# Patient Record
Sex: FEMALE | Race: BLACK OR AFRICAN AMERICAN | URBAN - METROPOLITAN AREA
[De-identification: names, ages, dates, MRNs, and addresses within clinical notes are randomized per-mention and may not be internally consistent; named-entity substitution may affect disease eponyms.]

---

## 2024-04-09 ENCOUNTER — HOSPITAL ENCOUNTER (OUTPATIENT)
Dept: WOMENS IMAGING | Age: 69
Discharge: HOME | End: 2024-04-09
Payer: MEDICARE

## 2024-04-09 DIAGNOSIS — Z78.0 MENOPAUSE: ICD-10-CM

## 2024-04-09 DIAGNOSIS — Z12.31 ENCOUNTER FOR SCREENING MAMMOGRAM FOR BREAST CANCER: ICD-10-CM

## 2024-04-09 PROCEDURE — 77080 DXA BONE DENSITY AXIAL: CPT

## 2024-04-09 PROCEDURE — 77063 BREAST TOMOSYNTHESIS BI: CPT

## 2024-07-08 ENCOUNTER — CASE MANAGEMENT (OUTPATIENT)
Dept: WOMENS IMAGING | Age: 69
End: 2024-07-08

## 2024-09-06 ENCOUNTER — TRANSCRIBE ORDERS (OUTPATIENT)
Dept: ADMINISTRATIVE | Age: 69
End: 2024-09-06

## 2024-09-06 DIAGNOSIS — R92.2 INCONCLUSIVE MAMMOGRAPHY: Primary | ICD-10-CM

## 2024-10-04 ENCOUNTER — HOSPITAL ENCOUNTER (OUTPATIENT)
Dept: ULTRASOUND IMAGING | Age: 69
Discharge: HOME | End: 2024-10-04
Payer: COMMERCIAL

## 2024-10-04 ENCOUNTER — HOSPITAL ENCOUNTER (OUTPATIENT)
Dept: WOMENS IMAGING | Age: 69
Discharge: HOME | End: 2024-10-04
Payer: COMMERCIAL

## 2024-10-04 DIAGNOSIS — R92.2 INCONCLUSIVE MAMMOGRAPHY: ICD-10-CM

## 2024-10-04 DIAGNOSIS — R92.8 ABNORMAL MAMMOGRAM: ICD-10-CM

## 2024-10-04 PROCEDURE — G0279 TOMOSYNTHESIS, MAMMO: HCPCS

## 2024-10-04 PROCEDURE — 76642 ULTRASOUND BREAST LIMITED: CPT

## 2025-06-06 ENCOUNTER — APPOINTMENT (OUTPATIENT)
Dept: CT IMAGING | Age: 70
End: 2025-06-06
Payer: MEDICARE

## 2025-06-06 ENCOUNTER — APPOINTMENT (OUTPATIENT)
Dept: GENERAL RADIOLOGY | Age: 70
End: 2025-06-06
Payer: MEDICARE

## 2025-06-06 ENCOUNTER — HOSPITAL ENCOUNTER (OUTPATIENT)
Age: 70
Setting detail: OBSERVATION
Discharge: HOME | End: 2025-06-07
Attending: EMERGENCY MEDICINE
Payer: MEDICARE

## 2025-06-06 DIAGNOSIS — R40.0 SOMNOLENCE: Primary | ICD-10-CM

## 2025-06-06 DIAGNOSIS — Z79.01 ANTICOAGULATED ON COUMADIN: ICD-10-CM

## 2025-06-06 DIAGNOSIS — R53.1 GENERALIZED WEAKNESS: ICD-10-CM

## 2025-06-06 PROBLEM — R42 DIZZINESS: Status: ACTIVE | Noted: 2025-06-06

## 2025-06-06 LAB
ALBUMIN SERPL-MCNC: 3.6 G/DL (ref 3.4–5)
ALBUMIN/GLOB SERPL: 1 {RATIO} (ref 1.1–2.2)
ALP SERPL-CCNC: 80 U/L (ref 40–129)
ALT SERPL-CCNC: 13 U/L (ref 10–40)
ANION GAP SERPL CALCULATED.3IONS-SCNC: 10 MMOL/L (ref 3–16)
AST SERPL-CCNC: 23 U/L (ref 15–37)
BACTERIA URNS QL MICRO: ABNORMAL /HPF
BILIRUB SERPL-MCNC: 0.7 MG/DL (ref 0–1)
BILIRUB UR QL STRIP.AUTO: NEGATIVE
BUN SERPL-MCNC: 14 MG/DL (ref 7–20)
CALCIUM SERPL-MCNC: 8.7 MG/DL (ref 8.3–10.6)
CHLORIDE SERPL-SCNC: 106 MMOL/L (ref 99–110)
CLARITY UR: CLEAR
CO2 SERPL-SCNC: 23 MMOL/L (ref 21–32)
COLOR UR: YELLOW
CREAT SERPL-MCNC: 0.9 MG/DL (ref 0.6–1.2)
DEPRECATED RDW RBC AUTO: 15.5 % (ref 12.4–15.4)
EKG DIAGNOSIS: NORMAL
EKG Q-T INTERVAL: 362 MS
EKG QRS DURATION: 84 MS
EKG QTC CALCULATION (BAZETT): 445 MS
EKG R AXIS: 20 DEGREES
EKG T AXIS: 46 DEGREES
EKG VENTRICULAR RATE: 91 BPM
EPI CELLS #/AREA URNS AUTO: 3 /HPF (ref 0–5)
GFR SERPLBLD CREATININE-BSD FMLA CKD-EPI: 68 ML/MIN/{1.73_M2}
GLUCOSE SERPL-MCNC: 113 MG/DL (ref 70–99)
GLUCOSE UR STRIP.AUTO-MCNC: NEGATIVE MG/DL
HCT VFR BLD AUTO: 37.3 % (ref 36–48)
HGB BLD-MCNC: 11.8 G/DL (ref 12–16)
HGB UR QL STRIP.AUTO: NEGATIVE
HYALINE CASTS #/AREA URNS AUTO: 1 /LPF (ref 0–8)
INR PPP: 1.95 (ref 0.85–1.15)
KETONES UR STRIP.AUTO-MCNC: ABNORMAL MG/DL
LEUKOCYTE ESTERASE UR QL STRIP.AUTO: ABNORMAL
MCH RBC QN AUTO: 26.7 PG (ref 26–34)
MCHC RBC AUTO-ENTMCNC: 31.6 G/DL (ref 31–36)
MCV RBC AUTO: 84.5 FL (ref 80–100)
NITRITE UR QL STRIP.AUTO: NEGATIVE
PH UR STRIP.AUTO: 6.5 [PH] (ref 5–8)
PLATELET # BLD AUTO: 188 K/UL (ref 135–450)
PMV BLD AUTO: 10.5 FL (ref 5–10.5)
POTASSIUM SERPL-SCNC: 4.4 MMOL/L (ref 3.5–5.1)
PROT SERPL-MCNC: 7.1 G/DL (ref 6.4–8.2)
PROT UR STRIP.AUTO-MCNC: NEGATIVE MG/DL
PROTHROMBIN TIME: 22.3 SEC (ref 11.9–14.9)
RBC # BLD AUTO: 4.41 M/UL (ref 4–5.2)
RBC CLUMPS #/AREA URNS AUTO: 1 /HPF (ref 0–4)
SODIUM SERPL-SCNC: 139 MMOL/L (ref 136–145)
SP GR UR STRIP.AUTO: 1.02 (ref 1–1.03)
TROPONIN, HIGH SENSITIVITY: 11 NG/L (ref 0–14)
TSH SERPL DL<=0.005 MIU/L-ACNC: 3.67 UIU/ML (ref 0.27–4.2)
UA DIPSTICK W REFLEX MICRO PNL UR: YES
URN SPEC COLLECT METH UR: ABNORMAL
UROBILINOGEN UR STRIP-ACNC: 4 E.U./DL
WBC # BLD AUTO: 6.8 K/UL (ref 4–11)
WBC #/AREA URNS AUTO: 2 /HPF (ref 0–5)

## 2025-06-06 PROCEDURE — 70450 CT HEAD/BRAIN W/O DYE: CPT

## 2025-06-06 PROCEDURE — 2500000003 HC RX 250 WO HCPCS

## 2025-06-06 PROCEDURE — 80053 COMPREHEN METABOLIC PANEL: CPT

## 2025-06-06 PROCEDURE — 96360 HYDRATION IV INFUSION INIT: CPT

## 2025-06-06 PROCEDURE — 96361 HYDRATE IV INFUSION ADD-ON: CPT

## 2025-06-06 PROCEDURE — 71045 X-RAY EXAM CHEST 1 VIEW: CPT

## 2025-06-06 PROCEDURE — G0378 HOSPITAL OBSERVATION PER HR: HCPCS

## 2025-06-06 PROCEDURE — 84443 ASSAY THYROID STIM HORMONE: CPT

## 2025-06-06 PROCEDURE — 85027 COMPLETE CBC AUTOMATED: CPT

## 2025-06-06 PROCEDURE — 93005 ELECTROCARDIOGRAM TRACING: CPT | Performed by: EMERGENCY MEDICINE

## 2025-06-06 PROCEDURE — 84484 ASSAY OF TROPONIN QUANT: CPT

## 2025-06-06 PROCEDURE — 99285 EMERGENCY DEPT VISIT HI MDM: CPT

## 2025-06-06 PROCEDURE — 96372 THER/PROPH/DIAG INJ SC/IM: CPT

## 2025-06-06 PROCEDURE — 93010 ELECTROCARDIOGRAM REPORT: CPT | Performed by: STUDENT IN AN ORGANIZED HEALTH CARE EDUCATION/TRAINING PROGRAM

## 2025-06-06 PROCEDURE — 85610 PROTHROMBIN TIME: CPT

## 2025-06-06 PROCEDURE — 6360000002 HC RX W HCPCS

## 2025-06-06 PROCEDURE — 81001 URINALYSIS AUTO W/SCOPE: CPT

## 2025-06-06 PROCEDURE — 2580000003 HC RX 258: Performed by: EMERGENCY MEDICINE

## 2025-06-06 PROCEDURE — 6370000000 HC RX 637 (ALT 250 FOR IP)

## 2025-06-06 RX ORDER — WARFARIN SODIUM 2.5 MG/1
2.5 TABLET ORAL
COMMUNITY

## 2025-06-06 RX ORDER — SODIUM CHLORIDE 0.9 % (FLUSH) 0.9 %
5-40 SYRINGE (ML) INJECTION PRN
Status: DISCONTINUED | OUTPATIENT
Start: 2025-06-06 | End: 2025-06-07 | Stop reason: HOSPADM

## 2025-06-06 RX ORDER — ONDANSETRON 2 MG/ML
4 INJECTION INTRAMUSCULAR; INTRAVENOUS EVERY 6 HOURS PRN
Status: DISCONTINUED | OUTPATIENT
Start: 2025-06-06 | End: 2025-06-07 | Stop reason: HOSPADM

## 2025-06-06 RX ORDER — ERGOCALCIFEROL 1.25 MG/1
50000 CAPSULE, LIQUID FILLED ORAL WEEKLY
COMMUNITY

## 2025-06-06 RX ORDER — 0.9 % SODIUM CHLORIDE 0.9 %
500 INTRAVENOUS SOLUTION INTRAVENOUS ONCE
Status: COMPLETED | OUTPATIENT
Start: 2025-06-06 | End: 2025-06-06

## 2025-06-06 RX ORDER — CARVEDILOL 6.25 MG/1
6.25 TABLET ORAL 2 TIMES DAILY WITH MEALS
COMMUNITY

## 2025-06-06 RX ORDER — WARFARIN SODIUM 2.5 MG/1
2.5 TABLET ORAL
Status: ACTIVE | OUTPATIENT
Start: 2025-06-06 | End: 2025-06-07

## 2025-06-06 RX ORDER — TRAMADOL HYDROCHLORIDE 50 MG/1
50 TABLET ORAL ONCE
Status: DISCONTINUED | OUTPATIENT
Start: 2025-06-06 | End: 2025-06-07 | Stop reason: HOSPADM

## 2025-06-06 RX ORDER — FUROSEMIDE 20 MG/1
20 TABLET ORAL DAILY
Status: DISCONTINUED | OUTPATIENT
Start: 2025-06-06 | End: 2025-06-07 | Stop reason: HOSPADM

## 2025-06-06 RX ORDER — ENOXAPARIN SODIUM 100 MG/ML
30 INJECTION SUBCUTANEOUS 2 TIMES DAILY
Status: DISCONTINUED | OUTPATIENT
Start: 2025-06-06 | End: 2025-06-07

## 2025-06-06 RX ORDER — ATORVASTATIN CALCIUM 80 MG/1
80 TABLET, FILM COATED ORAL NIGHTLY
Status: DISCONTINUED | OUTPATIENT
Start: 2025-06-06 | End: 2025-06-07

## 2025-06-06 RX ORDER — ONDANSETRON 4 MG/1
4 TABLET, ORALLY DISINTEGRATING ORAL EVERY 8 HOURS PRN
Status: DISCONTINUED | OUTPATIENT
Start: 2025-06-06 | End: 2025-06-07 | Stop reason: HOSPADM

## 2025-06-06 RX ORDER — CARVEDILOL 6.25 MG/1
6.25 TABLET ORAL 2 TIMES DAILY WITH MEALS
Status: DISCONTINUED | OUTPATIENT
Start: 2025-06-06 | End: 2025-06-07 | Stop reason: HOSPADM

## 2025-06-06 RX ORDER — SODIUM CHLORIDE 9 MG/ML
INJECTION, SOLUTION INTRAVENOUS PRN
Status: DISCONTINUED | OUTPATIENT
Start: 2025-06-06 | End: 2025-06-07 | Stop reason: HOSPADM

## 2025-06-06 RX ORDER — FUROSEMIDE 20 MG/1
20 TABLET ORAL DAILY
COMMUNITY

## 2025-06-06 RX ORDER — LOSARTAN POTASSIUM 100 MG/1
100 TABLET ORAL DAILY
COMMUNITY

## 2025-06-06 RX ORDER — POLYETHYLENE GLYCOL 3350 17 G/17G
17 POWDER, FOR SOLUTION ORAL DAILY PRN
Status: DISCONTINUED | OUTPATIENT
Start: 2025-06-06 | End: 2025-06-07 | Stop reason: HOSPADM

## 2025-06-06 RX ORDER — ASPIRIN 300 MG/1
300 SUPPOSITORY RECTAL DAILY
Status: DISCONTINUED | OUTPATIENT
Start: 2025-06-06 | End: 2025-06-07

## 2025-06-06 RX ORDER — SODIUM CHLORIDE 0.9 % (FLUSH) 0.9 %
5-40 SYRINGE (ML) INJECTION EVERY 12 HOURS SCHEDULED
Status: DISCONTINUED | OUTPATIENT
Start: 2025-06-06 | End: 2025-06-07 | Stop reason: HOSPADM

## 2025-06-06 RX ORDER — ASPIRIN 81 MG/1
81 TABLET, CHEWABLE ORAL DAILY
Status: DISCONTINUED | OUTPATIENT
Start: 2025-06-06 | End: 2025-06-07

## 2025-06-06 RX ADMIN — SODIUM CHLORIDE 500 ML: 0.9 INJECTION, SOLUTION INTRAVENOUS at 13:33

## 2025-06-06 RX ADMIN — ENOXAPARIN SODIUM 30 MG: 100 INJECTION SUBCUTANEOUS at 21:17

## 2025-06-06 RX ADMIN — ATORVASTATIN CALCIUM 80 MG: 80 TABLET, FILM COATED ORAL at 21:03

## 2025-06-06 RX ADMIN — FUROSEMIDE 20 MG: 20 TABLET ORAL at 18:49

## 2025-06-06 RX ADMIN — ASPIRIN 81 MG: 81 TABLET, CHEWABLE ORAL at 18:49

## 2025-06-06 RX ADMIN — SODIUM CHLORIDE, PRESERVATIVE FREE 10 ML: 5 INJECTION INTRAVENOUS at 21:03

## 2025-06-06 RX ADMIN — CARVEDILOL 6.25 MG: 6.25 TABLET, FILM COATED ORAL at 18:49

## 2025-06-06 ASSESSMENT — PAIN SCALES - GENERAL: PAINLEVEL_OUTOF10: 0

## 2025-06-06 ASSESSMENT — PAIN - FUNCTIONAL ASSESSMENT: PAIN_FUNCTIONAL_ASSESSMENT: 0-10

## 2025-06-06 NOTE — ED PROVIDER NOTES
[facility] EMERGENCY DEPARTMENT  EMERGENCY DEPARTMENT ENCOUNTER      Pt Name: Kendra Jimenez  MRN: [id number]  Birthdate 1955  Date of evaluation: 6/6/2025  Provider: PAULY CAROLINA DO    CHIEF COMPLAINT  Chief Complaint   Patient presents with    Dizziness     Episode of lightheadedness and dizziness while out to lunch.  Per squfelton, , neg stroke scale. Hx a-fib 141/66, rr 16, O2 100%, hr 73    Altered Mental Status     Per friend, groggy and not at baseline         This patient is at risk for a communicable infection.  Therefore, personal protection equipment consisting of a mask was worn for the exam.    HPI  Kendra Jimenez is a 70 y.o. female who presents with dizziness that actually started last night.  She had an episode before she went to bed.  It was lightheadedness.  She denies any vertigo or spinning sensation.  It lasted a few minutes and she went to bed and went to sleep and it went away.  However, today she woke up she was feeling fine and she went to a restaurant and ate today.  She then started getting this feeling of lightheadedness.  She again denied any vertigo.  She denies starting any new medications.  She denies any medication changes.  She denies any chest pains or shortness of breath.  She has a history of irregular heartbeat and atrial fibrillation and currently is on warfarin.    REVIEW OF SYSTEMS  All systems negative except as noted in the HPI.  Reviewed Nurses' notes and concur.    No LMP recorded. Patient has had a hysterectomy.    PAST MEDICAL HISTORY  Past Medical History:   Diagnosis Date    Hypertension     Thyroid disease        FAMILY HISTORY  Family History   Problem Relation Age of Onset    Breast Cancer Sister        SOCIAL HISTORY   reports that she has never smoked. She does not have any smokeless tobacco history on file. She reports current alcohol use. She reports that she does not use drugs.    SURGICAL HISTORY  Past Surgical History:   Procedure     Alteration in temperature such as hyperthermia or hypothermia    Dehydration, sleep deprivation   Change in medical regimen    Alteration in lifestyle, environment, or personal relationships    EKG-ordered to rule out myocardial infarction, rhythm disturbances, conduction disturbances, LVH, TIERRA, pericarditis, voltage abnormalities, or other pathology that might be causing the patient's symptoms.    Chest x-ray-chest x-ray was ordered to rule out pneumonia, pneumothorax, congestive heart failure, cardiomegaly, chest masses, aortic aneurysm, hiatal hernia, rib fractures, or any other pathology that might be causing the patient's symptoms    CT head-CT scan of the head was ordered to rule out stroke, intracranial hemorrhage, thrombotic stroke, embolic stroke, mass lesions, cerebral contusion, intracranial infection or abscesses.    CBC-CBC was ordered to rule out anemia, infection, abnormal platelet count, polycythemia, abnormal Red cell pathology, or any other pathology that might be causing the patient's symptoms    CMP-CMP was ordered to rule out electrolyte abnormalities, kidney dysfunction, electrolyte imbalance, abnormal transaminases, liver dysfunction, or any other pathology that might be causing the patient's symptoms.    Urine-urinalysis was ordered to rule out infection, renal failure, dehydration, pregnancy, proteinuria, bilirubinuria, or any other pathology that might be causing the patient's symptoms    The patient's blood pressure was found to be elevated according to CMS/Medicare and the Affordable Care Act/ObamaCare criteria. Elevated blood pressure could occur because of pain or anxiety or other reasons and does not mean that they need to have their blood pressure treated or medications otherwise adjusted. However, this could also be a sign that they will need to have their blood pressure treated or medications changed.    The patient was instructed to follow up closely with their personal

## 2025-06-06 NOTE — PROGRESS NOTES
Medication Reconciliation    List of medications patient is currently taking is complete.     Source of information: 1. Conversation withpatient at bedside                                      2. EPIC records      Allergies  Lisinopril     Notes regarding home medications:   1. Patient received all of her home morning medications except furosemide prior to arrival to the emergency department.  2. Patient takes warfarin. Has INR checked weekly (Tuesdays). Goal INR is 2-3. Dose is currently alternating between 5mg and 7.5mg. Patient took warfarin 7.5mg this morning.    Neva Thakur, Pharmacy Intern  6/6/2025 5:14 PM

## 2025-06-06 NOTE — PROGRESS NOTES
Patient to room 5267 from ED. Patient oriented to room and resting in bed. Bed alarm on. Call light and tray table within reach. Placed on tele and confirmed with [facility]. No expressed needs at this time. Electronically signed by Solange Winkler RN on 6/6/2025 at 6:22 PM

## 2025-06-06 NOTE — CONSULTS
Clinical Pharmacy Note  Warfarin Consult    Kendra Jimenez is a 70 y.o. female receiving warfarin managed by pharmacy.      Warfarin Indication: A. Fib   Target INR range: 2.0-3.0   Dose prior to admission: Alternating 7.5 mg and 5 mg, patient took 7.5 mg this morning    Current warfarin drug-drug interactions: None    Recent Labs     06/06/25  1249   HGB 11.8*   HCT 37.3   INR 1.95*       Assessment/Plan:    Patients INR came back a little low will give Warfarin 2.5 mg tonight. Daily PT/INR until stable within therapeutic range.     Thank you for the consult.  Will continue to follow.     Ravin Ramsay, PharmD  6/6/2025 6:18 PM

## 2025-06-06 NOTE — PROGRESS NOTES
4 Eyes Skin Assessment     NAME:  Kendra Jimenez  YOB: 1955  MEDICAL RECORD NUMBER:  [id number]    The patient is being assessed for  Admission    I agree that at least one RN has performed a thorough Head to Toe Skin Assessment on the patient. ALL assessment sites listed below have been assessed.      Areas assessed by both nurses:    Head, Face, Ears, Shoulders, Back, Chest, Arms, Elbows, Hands, Sacrum. Buttock, Coccyx, Ischium, Legs. Feet and Heels, and Under Medical Devices         Does the Patient have a Wound? No noted wound(s)       Jose Prevention initiated by RN: No  Wound Care Orders initiated by RN: No    Pressure Injury (Stage 3,4, Unstageable, DTI, NWPT, and Complex wounds) if present, place Wound referral order by RN under : No    New Ostomies, if present place, Ostomy referral order under : No     Nurse 1 eSignature: Electronically signed by Solange Winkler RN on 6/6/25 at 6:24 PM EDT    **SHARE this note so that the co-signing nurse can place an eSignature**    Nurse 2 eSignature: Electronically signed by Alicia Lawrence RN on 6/6/25 at 6:57 PM EDT

## 2025-06-06 NOTE — ED TRIAGE NOTES
Episode of lightheadedness and dizziness while out to lunch.  Per cory, , neg stroke scale. Hx a-fib 141/66, rr 16, O2 100%, hr 73

## 2025-06-06 NOTE — H&P
Name:  Kendra Jimenez /Age/Sex: 1955  (70 y.o. female)   MRN & CSN:  [id number] & [id number] Encounter Date/Time: 2025 6:32 PM EDT   Location:  [id number]/5267-01 PCP: Giovanna Henley MD     Attending:Bob Osei MD       Kendra Jimenez is a 70 y.o. female who presented with     Chief Complaint   Patient presents with    Dizziness     Episode of lightheadedness and dizziness while out to lunch.  Per squad, , neg stroke scale. Hx a-fib 141/66, rr 16, O2 100%, hr 73    Altered Mental Status     Per friend, groggy and not at baseline          Assessment and Plan     Assessment:    Dizziness  Stroke rule out  Episode of AMS  Essential hypertension  Atrial fibrillation    Plan:      CT head negative  Started on aspirin and statin  Orthostatic vitals ordered  MRI brain ordered  Neurology consult  Neurochecks every 4 hours  PT OT evaluation  Continue Coreg  Monitor on telemetry  Warfarin dosing per pharmacy    HPI :      Kendra Jimenez is a 70 y.o. female with  has a past medical history of Hypertension and Thyroid disease. who presented with chief complaints of lightheadedness.  Patient reports having an episode of lightheadedness last night before she went to bed.  Today morning she was seated in a restaurant along with her friends suddenly started to notice very lightheaded.  Currently reports still feeling little lightheaded and feels she is slow to respond.  Reports having some left lower extremity pain which is new for her.  Denies any active chest pain or shortness of breath    Advance care planning was discussed with the patient for total of 16 minutes.  Full code, DNR CC, DNR CCA, power of  and living will were discussed and patient elected to be full code     Review of Systems:      10 point ROS negative except stated above    Objective:     No intake or output data in the 24 hours ending 25 1832   Vitals:   Vitals:    25 1645 25 1700 25 1715

## 2025-06-07 ENCOUNTER — APPOINTMENT (OUTPATIENT)
Dept: MRI IMAGING | Age: 70
End: 2025-06-07
Payer: MEDICARE

## 2025-06-07 VITALS
BODY MASS INDEX: 55.32 KG/M2 | OXYGEN SATURATION: 100 % | WEIGHT: 293 LBS | DIASTOLIC BLOOD PRESSURE: 47 MMHG | RESPIRATION RATE: 16 BRPM | HEIGHT: 61 IN | TEMPERATURE: 98.1 F | HEART RATE: 51 BPM | SYSTOLIC BLOOD PRESSURE: 119 MMHG

## 2025-06-07 LAB
ANION GAP SERPL CALCULATED.3IONS-SCNC: 11 MMOL/L (ref 3–16)
BUN SERPL-MCNC: 20 MG/DL (ref 7–20)
CALCIUM SERPL-MCNC: 8.2 MG/DL (ref 8.3–10.6)
CHLORIDE SERPL-SCNC: 109 MMOL/L (ref 99–110)
CHOLEST SERPL-MCNC: 149 MG/DL (ref 0–199)
CO2 SERPL-SCNC: 23 MMOL/L (ref 21–32)
CREAT SERPL-MCNC: 1 MG/DL (ref 0.6–1.2)
D-DIMER QUANTITATIVE: 1.36 UG/ML FEU (ref 0–0.6)
DEPRECATED RDW RBC AUTO: 15.6 % (ref 12.4–15.4)
EST. AVERAGE GLUCOSE BLD GHB EST-MCNC: 96.8 MG/DL
GFR SERPLBLD CREATININE-BSD FMLA CKD-EPI: 60 ML/MIN/{1.73_M2}
GLUCOSE SERPL-MCNC: 159 MG/DL (ref 70–99)
HBA1C MFR BLD: 5 %
HCT VFR BLD AUTO: 33.5 % (ref 36–48)
HDLC SERPL-MCNC: 38 MG/DL (ref 40–60)
HGB BLD-MCNC: 10.9 G/DL (ref 12–16)
INR PPP: 2.01 (ref 0.85–1.15)
LDLC SERPL CALC-MCNC: 94 MG/DL
MCH RBC QN AUTO: 27.4 PG (ref 26–34)
MCHC RBC AUTO-ENTMCNC: 32.5 G/DL (ref 31–36)
MCV RBC AUTO: 84.4 FL (ref 80–100)
PLATELET # BLD AUTO: 175 K/UL (ref 135–450)
PMV BLD AUTO: 10.9 FL (ref 5–10.5)
POTASSIUM SERPL-SCNC: 4.1 MMOL/L (ref 3.5–5.1)
PROTHROMBIN TIME: 22.9 SEC (ref 11.9–14.9)
RBC # BLD AUTO: 3.97 M/UL (ref 4–5.2)
SODIUM SERPL-SCNC: 143 MMOL/L (ref 136–145)
TRIGL SERPL-MCNC: 87 MG/DL (ref 0–150)
VLDLC SERPL CALC-MCNC: 17 MG/DL
WBC # BLD AUTO: 8.5 K/UL (ref 4–11)

## 2025-06-07 PROCEDURE — 85379 FIBRIN DEGRADATION QUANT: CPT

## 2025-06-07 PROCEDURE — 85610 PROTHROMBIN TIME: CPT

## 2025-06-07 PROCEDURE — 36415 COLL VENOUS BLD VENIPUNCTURE: CPT

## 2025-06-07 PROCEDURE — 80048 BASIC METABOLIC PNL TOTAL CA: CPT

## 2025-06-07 PROCEDURE — G0378 HOSPITAL OBSERVATION PER HR: HCPCS

## 2025-06-07 PROCEDURE — 97165 OT EVAL LOW COMPLEX 30 MIN: CPT

## 2025-06-07 PROCEDURE — 6360000002 HC RX W HCPCS

## 2025-06-07 PROCEDURE — 96372 THER/PROPH/DIAG INJ SC/IM: CPT

## 2025-06-07 PROCEDURE — 97116 GAIT TRAINING THERAPY: CPT

## 2025-06-07 PROCEDURE — 97530 THERAPEUTIC ACTIVITIES: CPT

## 2025-06-07 PROCEDURE — 83036 HEMOGLOBIN GLYCOSYLATED A1C: CPT

## 2025-06-07 PROCEDURE — 6370000000 HC RX 637 (ALT 250 FOR IP)

## 2025-06-07 PROCEDURE — 70551 MRI BRAIN STEM W/O DYE: CPT

## 2025-06-07 PROCEDURE — 94760 N-INVAS EAR/PLS OXIMETRY 1: CPT

## 2025-06-07 PROCEDURE — 85027 COMPLETE CBC AUTOMATED: CPT

## 2025-06-07 PROCEDURE — 80061 LIPID PANEL: CPT

## 2025-06-07 PROCEDURE — 97161 PT EVAL LOW COMPLEX 20 MIN: CPT

## 2025-06-07 RX ORDER — WARFARIN SODIUM 7.5 MG/1
7.5 TABLET ORAL
Status: DISCONTINUED | OUTPATIENT
Start: 2025-06-07 | End: 2025-06-07 | Stop reason: HOSPADM

## 2025-06-07 RX ADMIN — CARVEDILOL 6.25 MG: 6.25 TABLET, FILM COATED ORAL at 10:04

## 2025-06-07 RX ADMIN — ENOXAPARIN SODIUM 30 MG: 100 INJECTION SUBCUTANEOUS at 10:10

## 2025-06-07 RX ADMIN — ASPIRIN 81 MG: 81 TABLET, CHEWABLE ORAL at 10:04

## 2025-06-07 RX ADMIN — FUROSEMIDE 20 MG: 20 TABLET ORAL at 10:04

## 2025-06-07 ASSESSMENT — LIFESTYLE VARIABLES: HOW OFTEN DO YOU HAVE A DRINK CONTAINING ALCOHOL: NEVER

## 2025-06-07 NOTE — CONSULTS
Clinical Pharmacy Note  Warfarin Consult    Kendra Jimenez is a 70 y.o. female receiving warfarin managed by pharmacy.      Warfarin Indication: A. Fib   Target INR range: 2.0-3.0   Dose prior to admission: Alternating 7.5 mg and 5 mg, patient took 7.5 mg this morning    Current warfarin drug-drug interactions: None    Recent Labs     06/06/25  1249 06/07/25  0345   HGB 11.8* 10.9*   HCT 37.3 33.5*   INR 1.95* 2.01*       Assessment/Plan:    Warfarin 7.5mg tonight   Daily PT/INR until stable within therapeutic range.     Thank you for the consult.  Will continue to follow.   Electronically signed by Marino Chaidez RPH on 6/7/2025 at 10:33 AM

## 2025-06-07 NOTE — PROGRESS NOTES
[facility] - Occupational Therapy   Phone: [phone]    Occupational Therapy  Facility/Department:[address] PROGRESSIVE CARE    [x] Initial Evaluation            [x] Daily Treatment Note         [] Discharge Summary      Patient: Kendra Jimenez   : 1955   MRN: [id number]   Date of Service:  2025    Staff Mobility Recommendation: RWx1    AM-PAC Score: 19/24  Kendra Jimenez scored a 19/24 on the AM-PAC ADL Inpatient form. Current research shows that an AM-PAC score of 18 or greater is typically associated with a discharge to the patient's home setting. Based on the patient's AM-PAC score, and their current ADL deficits, it is recommended that the patient have 2-3 sessions per week of Occupational Therapy at d/c to increase the patient's independence.  At this time, this patient demonstrates the endurance and safety to discharge home with HHOT and a follow up treatment frequency of 2-3x/wk.   Please see assessment section for further patient specific details.    If patient discharges prior to next session this note will serve as a discharge summary.  Please see below for the latest assessment towards goals.     Discharge Recommendations: HHOT    Admitting Diagnosis:  Dizziness  Ordering Physician: Bob Osei MD   Current Admission Summary: per ED note, Kendra Jimenez is a 70 y.o. female who presents with dizziness that actually started last night.  She had an episode before she went to bed.  It was lightheadedness.  She denies any vertigo or spinning sensation.  It lasted a few minutes and she went to bed and went to sleep and it went away.  However, today she woke up she was feeling fine and she went to a restaurant and ate today.  She then started getting this feeling of lightheadedness.  She again denied any vertigo.  She denies starting any new medications.  She denies any medication changes.  She denies any chest pains or shortness of breath.  She has a history of irregular heartbeat and  position  Static Standing Balance: fair (+): maintains balance at SBA/supervision without use of UE support  Dynamic Standing Balance: fair: maintains balance at CGA without use of UE support    Cognition  WFL  Orientation:    alert and oriented x 4     Education  Barriers To Learning: none  Patient Education: patient educated on goals, OT role and benefits, plan of care, transfer training  Learning Assessment:  patient verbalizes and demonstrates understanding  Safety Interventions: patient at risk for falls, call light within reach, patient left in chair, chair alarm in place, gait belt, nurse notified, and heels offloaded    Plan  Frequency: 3-5 x/week  Current Treatment Recommendations: strengthening, balance training, functional mobility training, transfer training, gait training, endurance training, patient/caregiver education, ADL/self-care training, and safety education    Goals  Patient Goals: return home   Short Term Goals:  Time Frame: By acute discharge  Patient will complete lower body dressing with modified independent   Patient will complete upper body dressing with modified independent  Patient will complete toileting with modified independent  Patient will complete bathing with modified independent  Patient will complete grooming with modified independent  Patient will complete functional transfers with modified independent  Patient will complete functional mobility with modified independent    Above goals reviewed on 6/7/2025.  All goals are ongoing at this time unless indicated above.       Therapy Session Time     Individual Group Co-treatment   Time In 0741      Time Out 0805      Minutes 24           Timed Code Treatment Minutes: 9 Minutes (15 minute eval)  Total Treatment Minutes:  24 minutes       Minutes per charge:  Low complexity eval: 15 minutes  Therapeutic activity: 9 minutes      Electronically signed by JANE Costello on 6/7/2025 at 8:06 AM

## 2025-06-07 NOTE — PROGRESS NOTES
Slow clau  Vc's for safety when turning with the walker as the pt tends to get her feet outside the frame of the walker, no LOB noted and the pt has a quicker pace then with no device  Ambulation Trial 3:  Surface:level surface  Assistive Device: rollator (4WRW)  Assistance: stand by assistance  Distance: 40 feet x 1  Gait Mechanics:  good use of the rollator, appropriately used the brakes after instruction; attempted to sit onto the rollator for rest break w/o success due to the pt's body habitus  Comments: The pt decided that she liked the rollator the best due to her needed rest breaks when walking due to her arthritic knees  Balance:  Static Sitting Balance: good: independent with functional balance in unsupported position  Dynamic Sitting Balance: good: independent with functional balance in unsupported position  Static Standing Balance: fair (+): maintains balance at SBA/supervision without use of UE support  Dynamic Standing Balance: fair (-): maintains balance at CGA with use of UE support    Exercise:   No exercises performed this session.       Cognition  WFL  Orientation:    alert and oriented x 4    Education  Barriers To Learning: none  Patient Education: patient educated on PT role and benefits, general safety, proper use of assistive device/equipment, discharge recommendations  Learning Assessment:  patient verbalizes and demonstrates understanding  Safety Interventions: patient at risk for falls, call light within reach, patient left in chair, chair alarm in place, and gait belt    Plan  Frequency: 3-5 x/week  Current Treatment Recommendations: balance training, functional mobility training, transfer training, gait training, equipment evaluation/education, patient/caregiver education, and safety education    Goals  Patient Goals: to go home   Short Term Goals:  Time Frame: By acute discharge  Patient will complete bed mobility with Independent   Patient will complete transfers with modified  independent   Patient will ambulate 100 ft with use of rollator (4WRW) with modified independent     Above goals reviewed on 2025.  All goals are ongoing at this time unless indicated above.      Therapy Session Time      Individual Group Co-treatment   Time In 1129       Time Out 1207       Minutes 38           Timed Code Treatment Minutes:  23 Minutes  Total Treatment Minutes:  38 minutes    Minutes per charge:  Moderate complexity eval: 15 minutes  Gait trainin minutes      Electronically signed by Zo So, PT [id number] on 2025 at 12:15 PM

## 2025-06-07 NOTE — CARE COORDINATION
DISCHARGE PLANNING:    DME order noted for bariatric rollator.  Referral placed to [facility] per [facility].  DME order given to patient by bedside nurse.  [facility] should be able to deliver to home.      #[phone]  Electronically signed by Sierra Estrella RN on 6/7/2025 at 3:57 PM

## 2025-06-07 NOTE — CONSULTS
Neurology Consult Note    Patient: Kendra Jimenez MRN: [id number]    YOB: 1955  Age: 70 y.o.  Sex: female   Unit: [address]  Room/Bed: Y8J-8952/5267-01 Location: [facility]    Date of Consultation: 6/7/2025  Date of Admission: 6/6/2025 12:30 PM ( LOS: 0 days )  Primary Care Physician: Giovanna Henley MD   Consult Requested By: Bob Osei MD    Reason for Consult: dizzy, r/o stroke    IMPRESSION & RECOMMENDATIONS     IMPRESSION:  71 y/o woman with a history significant for recently diagnosed afib on coumadin (per her report started in 2025), and HTN, who is presenting with acute onset dizziness a/w speaking difficulties.  She describes a \"swimming\" in the head that is hard to determine if it was vertigo vs lightheadedness, and noted that she had trouble getting her words out on the second episode.  She is a/c on coumadin for afib, and INR was 1.95 on presentation.    Lightheadedness.  Difficult to tell if this was vertigo vs another form of dizziness.  She is at relatively low risk for TIA or stroke given her A/c with coumadin.  Could have been transient changes in hemodynamic parameters as well (bp or heart rate fluctuation).    1) dizziness  2) atrial fibrillation on coumadin  3) HTN    RECOMMENDATIONS:  - no further neurologic studies recommended    Neurology signing off, call with questions    Alicia Ivey MD   Neurology  6/7/2025 2:26 PM    History of Present Illness     Kendra Jimenez is a 70 y.o.  female with history significant for recently diagnosed afib on coumadin (per her report started in 2025), and HTN, who is presenting with acute onset dizziness a/w speaking difficulties.    On Thursday night when lying down to go to sleep she felt a \"swimming\" sensation in her head a/w some pressure above her eyes.  It went away on its own and she went to sleep without problems.  On Friday she went out to lunch with her friends, and about 15min after she began to        CURRENT SCHEDULED MEDICATIONS   Inpatient Medications     warfarin, 7.5 mg, Oral, Once    sodium chloride flush, 5-40 mL, IntraVENous, 2 times per day    carvedilol, 6.25 mg, Oral, BID WC    furosemide, 20 mg, Oral, Daily    warfarin placeholder: dosing by pharmacy, , Oral, RX Placeholder    traMADol, 50 mg, Oral, Once   Infusions    sodium chloride        Antibiotics   Recent Abx Admin        No antibiotic orders with administrations found.

## 2025-06-07 NOTE — PROGRESS NOTES
Physical Therapy  Attempt Note    Kendra Jimenez  [id number]    The pt is currently out of the room at MRI. Will attempt back later.    Electronically signed by Zo So, PT [id number] on 6/7/2025 at 9:13 AM

## 2025-06-07 NOTE — PLAN OF CARE
Problem: Discharge Planning  Goal: Discharge to home or other facility with appropriate resources  Outcome: Progressing  Flowsheets  Taken 6/7/2025 0931 by Jimena Vu RN  Discharge to home or other facility with appropriate resources: Identify barriers to discharge with patient and caregiver       Problem: Pain  Goal: Verbalizes/displays adequate comfort level or baseline comfort level  Outcome: Progressing  Flowsheets (Taken 6/7/2025 0931)  Verbalizes/displays adequate comfort level or baseline comfort level: Encourage patient to monitor pain and request assistance     Problem: Safety - Adult  Goal: Free from fall injury  Outcome: Progressing  Flowsheets (Taken 6/7/2025 0931)  Free From Fall Injury: Instruct family/caregiver on patient safety     Problem: ABCDS Injury Assessment  Goal: Absence of physical injury  Outcome: Progressing  Flowsheets (Taken 6/7/2025 0931)  Absence of Physical Injury: Implement safety measures based on patient assessment

## 2025-06-07 NOTE — PROGRESS NOTES
NAME:  Kendra Jimenez  YOB: 1955  MEDICAL RECORD NUMBER:  [id number]  TODAYS DATE:  6/7/2025    Discussed personal risk factors for Stroke/TIA with patient/family, and ways to reduce the risk for a recurrent stroke. Patient's personal risk factors which were identified are:     [x] Alcohol Abuse: check with your physician before any alcohol consumption.   [x] Atrial fibrillation: may cause blood clots.  [x] Drug Abuse: Seek help, talk with your doctor  [x] Clotting Disorder  [x] Diabetes  [x] Family history of stroke or heart disease  [x] High Blood Pressure/Hypertension: work with your physician.  [x] High cholesterol: monitor cholesterol levels with your physician.   [x] Overweight/Obesity: work with your physician for your ideal body weight.  [x] Physical Inactivity: get regular exercise as directed by your physician.   [x] Personal history of previous TIA or stroke  [x] Poor Diet; decrease salt (sodium) in your diet, follow diet directed by physician.   [x] Smoking: Cigarette/Cigar: stop smoking.         Advised pt. that you can reduce your risk for stroke/TIA by modifying/controlling the risk factors that you have. Pt.advised to take the medications as prescribed, which will be detailed in the discharge instructions, and to not stop taking them without consulting their physician. In addition, pt. advised to maintain a healthy diet, exercise regularly and to not smoke.      [facility]'s Stroke treatment and prevention, Managing your recovery  notebook  provided and/or reviewed  with patient/family.  The notebook includes, but not limited to, sections addressing warning signs & symptoms of a stroke, which are: sudden numbness or weakness especially on one side of the body, sudden confusion, difficulty speaking or understanding, sudden changes in vision, sudden dizziness or loss of balance/ coordination, sudden severe headache, syncope and seizure.  The need to call EMS (911) immediately if signs

## 2025-06-07 NOTE — DISCHARGE INSTR - COC
NO:}  Bladder: {YES / NO:}  Urinary Catheter: {Urinary Catheter:272829409}   Colostomy/Ileostomy/Ileal Conduit: {YES / NO:}       Date of Last BM: ***    Intake/Output Summary (Last 24 hours) at 2025 1356  Last data filed at 2025 1343  Gross per 24 hour   Intake 240 ml   Output --   Net 240 ml     No intake/output data recorded.    Safety Concerns:     { ALEX Safety Concerns:310699680}    Impairments/Disabilities:      { ALEX Impairments/Disabilities:002922814}    Nutrition Therapy:  Current Nutrition Therapy:   { [facility] Diet List:815320686}    Routes of Feeding: {[facility] Other Feedings:616844756}  Liquids: {Slp liquid thickness:28000}  Daily Fluid Restriction: {[facility] DME Yes amt example:407963303}  Last Modified Barium Swallow with Video (Video Swallowing Test): {Done Not Done Date:}    Treatments at the Time of Hospital Discharge:   Respiratory Treatments: ***  Oxygen Therapy:  {Therapy; copd oxygen:65816}  Ventilator:    {[facility] Vent List:088132157}    Rehab Therapies: {THERAPEUTIC INTERVENTION:2113030343}  Weight Bearing Status/Restrictions: {[facility] Weight Bearin}  Other Medical Equipment (for information only, NOT a DME order):  {EQUIPMENT:852862482}  Other Treatments: ***    Patient's personal belongings (please select all that are sent with patient):  {[facility] Belongings:421404130}    RN SIGNATURE:  {Esignature:435376683}    CASE MANAGEMENT/SOCIAL WORK SECTION    Inpatient Status Date: ***    Readmission Risk Assessment Score:  [facility] RISK OF UNPLANNED READMISSION 2.0             0 Total Score        Discharging to Facility/ Agency   Name:   Address:  Phone:  Fax:    Dialysis Facility (if applicable)   Name:  Address:  Dialysis Schedule:  Phone:  Fax:    / signature: {Esignature:588246107}    PHYSICIAN SECTION    Prognosis: Fair    Condition at Discharge: Stable    Rehab Potential (if transferring to Rehab): Fair    Recommended Labs or Other  Treatments After Discharge: Follow up with PCP in 1 week    Physician Certification: I certify the above information and transfer of Kendra Jimenez  is necessary for the continuing treatment of the diagnosis listed and that she requires {Admit to Appropriate Level of Care:36496} for {GREATER/LESS:294436229} 30 days.     Update Admission H&P: No change in H&P    PHYSICIAN SIGNATURE:  Electronically signed by Bob Osei MD on 6/7/25 at 1:56 PM EDT

## 2025-06-07 NOTE — FLOWSHEET NOTE
Patient with discharge orders, IV removed. Patient family collected all personal belongings. Patient family assisted with patient dressing. Patient DME  not onsite,  aware of company referral  and will reach out for possible delivery. Patient with printed order.  Patient provided discharge paperwork and given an opportunity to review and ask questions and get clarification.  Patient taken by wheelchair to main entrance,patient discharged home in private vehicle  with family.Discharge completed.

## 2025-06-07 NOTE — PROGRESS NOTES
SLP NOTE    SLP eval/tx order received per stroke r/o protocol.  Patient met at bedside. SLP role/evaluation objectives discussed with patient; patient denies increased motor speech, receptive/expressive/cognitive language, and/or swallowing difficulties at this time.  Patient politely decline needs for SLP eval.    ST to discontinue evaluation attempts. Please re-consult ST should status change and/or if medical team deems evaluation necessary.    Thank you.  Kendal Callahan MS CCC/SLP [id number]  Speech Language Pathologist  06/07/25  11:29 AM

## 2025-06-07 NOTE — DISCHARGE SUMMARY
Medication List        CONTINUE taking these medications      carvedilol 6.25 MG tablet  Commonly known as: COREG     furosemide 20 MG tablet  Commonly known as: LASIX     losartan 100 MG tablet  Commonly known as: COZAAR     vitamin D 1.25 MG (49556 UT) Caps capsule  Commonly known as: ERGOCALCIFEROL     warfarin 2.5 MG tablet  Commonly known as: COUMADIN             Objective Findings at Discharge:   BP (!) 119/47   Pulse 51   Temp 98.1 °F (36.7 °C) (Oral)   Resp 16   Ht 1.549 m (5' 1\")   Wt 135.7 kg (299 lb 2.6 oz)   SpO2 100%   BMI 56.53 kg/m²       Physical Exam:     General: Obese  Eyes: EOMI  ENT: neck supple  Cardiovascular: Regular rate.  Respiratory: Clear to auscultation  Gastrointestinal: Soft, non tender  Genitourinary: no suprapubic tenderness  Musculoskeletal: No edema  Neuro: Alert.  Psych: Mood appropriate.         Labs and Imaging   MRI brain without contrast  Result Date: 6/7/2025  EXAM: MRI BRAIN WITHOUT CONTRAST 06/07/2025 09:14:58 AM TECHNIQUE: Multiplanar multisequence MRI of the head/brain was performed without the administration of intravenous contrast. COMPARISON: 06/06/2025 head CT CLINICAL HISTORY: Rule out stroke. FINDINGS: BRAIN AND VENTRICLES: No acute infarct. No intracranial hemorrhage. No mass. No midline shift. No hydrocephalus. The sella is unremarkable. Normal flow voids. Mild white matter signal abnormality suggesting changes of chronic small vessel disease. ORBITS: No acute abnormality. SINUSES AND MASTOIDS: Mild mucosal thickening in the ethmoid air cells. Extensive opacification involving the right mastoid air cells. Mild left mastoid air cell disease. BONES AND SOFT TISSUES: Normal marrow signal. No acute soft tissue abnormality.     1. No acute intracranial abnormality. 2. Mild white matter signal abnormality, suggestive of chronic small vessel disease. 3. Extensive opacification involving the right mastoid air cells and mild left mastoid air cell disease.     CT

## 2025-06-07 NOTE — PROGRESS NOTES
Kendra Jimenez was evaluated today and a DME order was entered for a wide rollator walker because she requires this to successfully complete daily living tasks of ambulating.  A wide rollator walker is necessary due to the patient's unsteady gait, upper body weakness, and inability to  an ambulation device; and she can ambulate only by pushing a walker instead of a lesser assistive device such as a cane, crutch, or standard walker.  The need for this equipment was discussed with the patient and she understands and is in agreement.

## 2025-06-07 NOTE — PLAN OF CARE
Problem: Discharge Planning  Goal: Discharge to home or other facility with appropriate resources  6/7/2025 1423 by Jimena Vu RN  Outcome: Completed  Flowsheets (Taken 6/7/2025 0931)  Discharge to home or other facility with appropriate resources: Identify barriers to discharge with patient and caregiver     Problem: Pain  Goal: Verbalizes/displays adequate comfort level or baseline comfort level  6/7/2025 1423 by Jimena Vu RN  Outcome: Completed  Flowsheets (Taken 6/7/2025 0931)  Verbalizes/displays adequate comfort level or baseline comfort level: Encourage patient to monitor pain and request assistance     Problem: Safety - Adult  Goal: Free from fall injury  6/7/2025 1423 by Jimena Vu RN  Outcome: Completed  Flowsheets (Taken 6/7/2025 0931)  Free From Fall Injury: Instruct family/caregiver on patient safety     Problem: ABCDS Injury Assessment  Goal: Absence of physical injury  6/7/2025 1423 by Jimena Vu RN  Outcome: Completed  Flowsheets (Taken 6/7/2025 0931)  Absence of Physical Injury: Implement safety measures based on patient assessment

## 2025-06-07 NOTE — CARE COORDINATION
DISCHARGE PLANNING:    Per chart review, patient is from home with family independent at baseline.  Patient has an active primary care physician and health insurance.  MRI pending, neuro consult noted.  Poss need for home care vs outpatient therapy depending on clinical improvement.    #[phone]  Electronically signed by Sierra Estrella RN on 6/7/2025 at 9:06 AM